# Patient Record
(demographics unavailable — no encounter records)

---

## 2024-10-15 NOTE — PHYSICAL EXAM
[NL (20)] : dorsiflexion 20 degrees [NL (40)] : plantar flexion 40 degrees [4___] : plantar flexion 4[unfilled]/5 [2+] : posterior tibialis pulse: 2+ [] : patient ambulates without assistive device [Right] : right foot [There are no fractures, subluxations or dislocations. No significant abnormalities are seen] : There are no fractures, subluxations or dislocations. No significant abnormalities are seen [de-identified] : plantar calcaneal spur

## 2024-10-15 NOTE — HISTORY OF PRESENT ILLNESS
[de-identified] : Patient presents for RT heel pain evaluation. Patient stats that she has been having pain for 2 weeks with NKI. Patient states that all of her pain is localized on the bottom on her foot. Patient has pain when she weightbears after sitting for a long time or when walking for long periods of times. Patient took Meloxicam and has been icing prn for some relief.

## 2024-10-15 NOTE — DISCUSSION/SUMMARY
[de-identified] : I reviewed patient's radiographs and discussed her condition and treatment options.  I advised applying frozen water bottle for pain relief and stretching.   Defer further imaging or injection.  Follow up in 4 weeks as needed for continued pain.  Patient voiced understanding and agreement with the plan.